# Patient Record
Sex: FEMALE | Race: BLACK OR AFRICAN AMERICAN | NOT HISPANIC OR LATINO | Employment: UNEMPLOYED | URBAN - METROPOLITAN AREA
[De-identification: names, ages, dates, MRNs, and addresses within clinical notes are randomized per-mention and may not be internally consistent; named-entity substitution may affect disease eponyms.]

---

## 2020-12-05 ENCOUNTER — HOSPITAL ENCOUNTER (EMERGENCY)
Facility: MEDICAL CENTER | Age: 25
End: 2020-12-05
Attending: EMERGENCY MEDICINE
Payer: MEDICAID

## 2020-12-05 VITALS
OXYGEN SATURATION: 98 % | HEART RATE: 76 BPM | TEMPERATURE: 98.5 F | BODY MASS INDEX: 31.97 KG/M2 | DIASTOLIC BLOOD PRESSURE: 106 MMHG | RESPIRATION RATE: 20 BRPM | SYSTOLIC BLOOD PRESSURE: 146 MMHG | WEIGHT: 173.72 LBS | HEIGHT: 62 IN

## 2020-12-05 DIAGNOSIS — L73.9 FOLLICULITIS: ICD-10-CM

## 2020-12-05 DIAGNOSIS — Z20.2 STD EXPOSURE: ICD-10-CM

## 2020-12-05 DIAGNOSIS — B96.89 GARDNERELLA VAGINITIS: ICD-10-CM

## 2020-12-05 DIAGNOSIS — N76.0 GARDNERELLA VAGINITIS: ICD-10-CM

## 2020-12-05 DIAGNOSIS — N89.8 VAGINAL DISCHARGE: ICD-10-CM

## 2020-12-05 DIAGNOSIS — H92.02 LEFT EAR PAIN: ICD-10-CM

## 2020-12-05 LAB
APPEARANCE UR: CLEAR
BILIRUB UR QL STRIP.AUTO: NEGATIVE
CANDIDA DNA VAG QL PROBE+SIG AMP: NEGATIVE
COLOR UR: YELLOW
G VAGINALIS DNA VAG QL PROBE+SIG AMP: POSITIVE
GLUCOSE UR STRIP.AUTO-MCNC: NEGATIVE MG/DL
HCG UR QL: NEGATIVE
KETONES UR STRIP.AUTO-MCNC: NEGATIVE MG/DL
LEUKOCYTE ESTERASE UR QL STRIP.AUTO: NEGATIVE
MICRO URNS: NORMAL
NITRITE UR QL STRIP.AUTO: NEGATIVE
PH UR STRIP.AUTO: 6 [PH] (ref 5–8)
PROT UR QL STRIP: NEGATIVE MG/DL
RBC UR QL AUTO: NEGATIVE
SP GR UR STRIP.AUTO: 1.03
T VAGINALIS DNA VAG QL PROBE+SIG AMP: NEGATIVE
TREPONEMA PALLIDUM IGG+IGM AB [PRESENCE] IN SERUM OR PLASMA BY IMMUNOASSAY: NORMAL
UROBILINOGEN UR STRIP.AUTO-MCNC: 0.2 MG/DL

## 2020-12-05 PROCEDURE — 96372 THER/PROPH/DIAG INJ SC/IM: CPT | Mod: EDC

## 2020-12-05 PROCEDURE — 87660 TRICHOMONAS VAGIN DIR PROBE: CPT | Mod: EDC

## 2020-12-05 PROCEDURE — 99284 EMERGENCY DEPT VISIT MOD MDM: CPT | Mod: EDC

## 2020-12-05 PROCEDURE — 87591 N.GONORRHOEAE DNA AMP PROB: CPT | Mod: EDC

## 2020-12-05 PROCEDURE — 81003 URINALYSIS AUTO W/O SCOPE: CPT | Mod: EDC

## 2020-12-05 PROCEDURE — 81025 URINE PREGNANCY TEST: CPT | Mod: EDC

## 2020-12-05 PROCEDURE — A9270 NON-COVERED ITEM OR SERVICE: HCPCS | Mod: EDC | Performed by: EMERGENCY MEDICINE

## 2020-12-05 PROCEDURE — 87491 CHLMYD TRACH DNA AMP PROBE: CPT | Mod: EDC

## 2020-12-05 PROCEDURE — 86780 TREPONEMA PALLIDUM: CPT | Mod: EDC

## 2020-12-05 PROCEDURE — 700101 HCHG RX REV CODE 250: Mod: EDC | Performed by: EMERGENCY MEDICINE

## 2020-12-05 PROCEDURE — 700102 HCHG RX REV CODE 250 W/ 637 OVERRIDE(OP): Mod: EDC | Performed by: EMERGENCY MEDICINE

## 2020-12-05 PROCEDURE — 700111 HCHG RX REV CODE 636 W/ 250 OVERRIDE (IP): Mod: EDC | Performed by: EMERGENCY MEDICINE

## 2020-12-05 PROCEDURE — 87480 CANDIDA DNA DIR PROBE: CPT | Mod: EDC

## 2020-12-05 PROCEDURE — 87510 GARDNER VAG DNA DIR PROBE: CPT | Mod: EDC

## 2020-12-05 RX ORDER — METRONIDAZOLE 500 MG/1
500 TABLET ORAL 2 TIMES DAILY
Qty: 14 TAB | Refills: 0 | Status: SHIPPED | OUTPATIENT
Start: 2020-12-05 | End: 2020-12-12

## 2020-12-05 RX ORDER — AZITHROMYCIN 250 MG/1
1000 TABLET, FILM COATED ORAL ONCE
Status: COMPLETED | OUTPATIENT
Start: 2020-12-05 | End: 2020-12-05

## 2020-12-05 RX ORDER — CEFTRIAXONE SODIUM 250 MG/1
250 INJECTION, POWDER, FOR SOLUTION INTRAMUSCULAR; INTRAVENOUS ONCE
Status: COMPLETED | OUTPATIENT
Start: 2020-12-05 | End: 2020-12-05

## 2020-12-05 RX ORDER — CEPHALEXIN 500 MG/1
500 CAPSULE ORAL 3 TIMES DAILY
Qty: 21 CAP | Refills: 0 | Status: SHIPPED | OUTPATIENT
Start: 2020-12-05 | End: 2020-12-12

## 2020-12-05 RX ADMIN — AZITHROMYCIN MONOHYDRATE 1000 MG: 250 TABLET ORAL at 18:44

## 2020-12-05 RX ADMIN — LIDOCAINE HYDROCHLORIDE 0.9 ML: 10 INJECTION, SOLUTION INFILTRATION; PERINEURAL at 18:45

## 2020-12-05 RX ADMIN — CEFTRIAXONE SODIUM 250 MG: 250 INJECTION, POWDER, FOR SOLUTION INTRAMUSCULAR; INTRAVENOUS at 18:45

## 2020-12-05 ASSESSMENT — LIFESTYLE VARIABLES: DO YOU DRINK ALCOHOL: NO

## 2020-12-06 NOTE — ED PROVIDER NOTES
ED Provider Note    CHIEF COMPLAINT  Chief Complaint   Patient presents with   • Vaginal Discharge     Green/ gray, malodorus disharge x 1 week.   • Lump     to left labia x 1 week   • Ear Pain     left, x 1 week, denies fevers       HPI  Valentin Persaud is a 25 y.o. female who presents to the emergency department complaining of left ear pain.  The patient's had left ear pain for about a week.  Pain is just inside the ear.  There is no drainage.  No sore throat runny nose or cough.  No right ear pain.  No headaches.    Patient complains of vaginal discharge.  She has any dysuria, hematuria dysuria frequency.  She just completed her menstrual period and she had some persistent malodorous discharge.  She she does have a new partner.  She has a remote history of STD but no recent STD.  She denies any other acute concerns or complaints.  No fevers chills nausea vomiting or other complaints.    REVIEW OF SYSTEMS  See HPI for further details. All other systems are negative.    PAST MEDICAL HISTORY  History reviewed. No pertinent past medical history.    FAMILY HISTORY  History reviewed. No pertinent family history.    SOCIAL HISTORY  Social History     Socioeconomic History   • Marital status: Single     Spouse name: Not on file   • Number of children: Not on file   • Years of education: Not on file   • Highest education level: Not on file   Occupational History   • Not on file   Social Needs   • Financial resource strain: Not on file   • Food insecurity     Worry: Not on file     Inability: Not on file   • Transportation needs     Medical: Not on file     Non-medical: Not on file   Tobacco Use   • Smoking status: Never Smoker   • Smokeless tobacco: Never Used   Substance and Sexual Activity   • Alcohol use: Not Currently   • Drug use: Not Currently   • Sexual activity: Not on file   Lifestyle   • Physical activity     Days per week: Not on file     Minutes per session: Not on file   • Stress: Not on file   Relationships  "  • Social connections     Talks on phone: Not on file     Gets together: Not on file     Attends Zoroastrian service: Not on file     Active member of club or organization: Not on file     Attends meetings of clubs or organizations: Not on file     Relationship status: Not on file   • Intimate partner violence     Fear of current or ex partner: Not on file     Emotionally abused: Not on file     Physically abused: Not on file     Forced sexual activity: Not on file   Other Topics Concern   • Not on file   Social History Narrative   • Not on file       SURGICAL HISTORY  History reviewed. No pertinent surgical history.    CURRENT MEDICATIONS  Home Medications     Reviewed by Rosalina Martínez R.N. (Registered Nurse) on 12/05/20 at 1717  Med List Status: Partial   Medication Last Dose Status        Patient David Taking any Medications                       ALLERGIES  No Known Allergies    PHYSICAL EXAM  VITAL SIGNS: /93   Pulse 71   Temp 37.3 °C (99.2 °F) (Temporal)   Resp 14   Ht 1.575 m (5' 2\")   Wt 78.8 kg (173 lb 11.6 oz)   SpO2 100%   BMI 31.77 kg/m²    Constitutional: Well developed, Well nourished, No acute distress, Non-toxic appearance.   HENT: Normocephalic, Atraumatic, Bilateral external ears normal, Oropharynx moist, No oral exudates, Nose normal.  Left TM is normal.  Left external canal is normal.  No mastoid tenderness.  Eyes: PERRL, EOMI, Conjunctiva normal, No discharge.   Neck: Normal range of motion  Cardiovascular: Normal heart rate, Normal rhythm, No murmurs,  Thorax & Lungs: Normal breath sounds, No respiratory distress  Abdomen: Bowel sounds normal, Soft, No tenderness  Skin: Warm, Dry, No erythema, No rash.   Genitalia: This exam is performed with Rosalina ROMERO as a chaperone.  There is a flesh-colored bump that is tender on the left side of her labia.  This is not a Bartholin's cyst.  It is somewhat shiny in appearance and may represent chancre.  There is no abscess to drain.  There is " no other lesions on her external mucosa or her internal mucosa.  She did not amount of discharge.  No cervicitis or cervical motion tenderness.  No adnexal masses  Musculoskeletal: Good range of motion in all major joints  Neurologic: Alert,  No focal deficits noted.   Psychiatric: Affect normal    Results for orders placed or performed during the hospital encounter of 12/05/20   URINALYSIS CULTURE, IF INDICATED    Specimen: Urine   Result Value Ref Range    Color Yellow     Character Clear     Specific Gravity 1.029 <1.035    Ph 6.0 5.0 - 8.0    Glucose Negative Negative mg/dL    Ketones Negative Negative mg/dL    Protein Negative Negative mg/dL    Bilirubin Negative Negative    Urobilinogen, Urine 0.2 Negative    Nitrite Negative Negative    Leukocyte Esterase Negative Negative    Occult Blood Negative Negative    Micro Urine Req see below    HCG QUALITATIVE UR (Lab)   Result Value Ref Range    Beta-Hcg Urine Negative Negative   CHLAMYDIA & GC BY PCR    Specimen: Genital   Result Value Ref Range    Source Vaginal    T.PALLIDUM AB EIA   Result Value Ref Range    Syphilis, Treponemal Qual Non-Reactive Non-Reactive   VAGINAL PATHOGENS DNA PANEL   Result Value Ref Range    Candida species DNA Probe Negative Negative    Trichamonas vaginalis DNA Probe Negative Negative    Gardnerella vaginalis DNA Probe POSITIVE (A) Negative        RADIOLOGY/PROCEDURES  No orders to display       COURSE & MEDICAL DECISION MAKING  Pertinent Labs & Imaging studies reviewed. (See chart for details)    The patient presents emerged part with multiple complaints.  Her first complaint is left ear pain.  She has no tenderness around the ear.  No signs of otitis externa or otitis media.  We will treat her otalgia over-the-counter pain medicines reassurance and follow-up with primary care.    Patient's next complaint is vaginal discharge.  She has a new partner and unprotected sex and is concerned about possible exposure to an STD.  Urinalysis  and pregnancy test are unremarkable.  The patient has a GC and chlamydia which are collected and empirically treated.    Her discharge a DNA probe was performed this positive Gardnerella we will treat her with Flagyl.      Regarding her STD exposure she is counseled practice safe sex.  She is referred to GYN.  Lastly she has a tender lump on the outside of her vagina.  This is on the skin surface noted to mucosa it is somewhat shiny in appearance.  Differential diagnosis includes folliculitis, HSV, or possible syphilis infection.  It is not vesicular nor does she have other vesicular lesions I think herpes is unlikely.  Syphilis is certainly likely but her syphilis test is negative.  We will treat empirically folliculitis because it is tender redness in the area multiple hair follicles.  This could be a pyogenic granuloma.  The patient again referred to primary care as well as GYN.    She is given instructions to return to the emergency department for increasing pain swelling redness or other concerns.  Empirically treated with Keflex for skin and soft tissue infection.  She will return    At this point her abdominal exam is benign.  She is afebrile and nontoxic she will be discharged home.  She will return for increasing pain or other concerns as discussed.  Questions are answered she is agreeable to plan she is discharged home.    Poncho Palomino M.D.  [address] [place] [id number] [phone]    Schedule an appointment as soon as possible for a visit in 2 days      [address] [address]  [phone]  Schedule an appointment as soon as possible for a visit in 2 days          FINAL IMPRESSION  1. Left ear pain     2. Vaginal discharge     3. Folliculitis     4. STD exposure     5. Gardnerella vaginitis         2.   3.         Electronically signed by: Ramón Chaidez M.D., 12/5/2020 5:49 PM

## 2020-12-06 NOTE — ED NOTES
"[facility]    Discharge instructions, which include signs and symptoms to monitor patient for, as well as detailed information regarding vaginal discharge provided.  All questions and concerns addressed at this time.  This RN also encouraged a follow- up appointment to be made with patient's PCP.     Prescription for keflex/flagyl provided to patient.    Patient leaves ER in no apparent distress. This RN provided education regarding returning to the ER for any new concerns or changes in patient's condition.      /106   Pulse 76   Temp 36.9 °C (98.5 °F) (Oral)   Resp 20   Ht 1.575 m (5' 2\")   Wt 78.8 kg (173 lb 11.6 oz)   SpO2 98%   BMI 31.77 kg/m²   "

## 2020-12-06 NOTE — ED TRIAGE NOTES
Pt ambulatory to room for   Chief Complaint   Patient presents with   • Vaginal Discharge     Green/ gray, malodorus disharge x 1 week.   • Lump     to left labia x 1 week   • Ear Pain     left, x 1 week, denies fevers     Pt reports new sexual partner and didn't wear a condom.  Pt is alert and in NAD

## 2020-12-06 NOTE — DISCHARGE INSTRUCTIONS
Take antibiotics as prescribed.  Take Flagyl for the vaginal discharge.  Take Keflex for the bump on the left side of your labia.  This may need further work-up and treatment as an outpatient if it persist.  Follow-up with primary care and GYN.  Return for pain, or other concerns.  Practice safe sex.

## 2020-12-07 LAB
C TRACH DNA SPEC QL NAA+PROBE: NEGATIVE
N GONORRHOEA DNA SPEC QL NAA+PROBE: NEGATIVE
SPECIMEN SOURCE: NORMAL

## 2021-01-08 ENCOUNTER — HOSPITAL ENCOUNTER (EMERGENCY)
Facility: MEDICAL CENTER | Age: 26
End: 2021-01-08
Attending: EMERGENCY MEDICINE
Payer: MEDICAID

## 2021-01-08 VITALS
RESPIRATION RATE: 16 BRPM | SYSTOLIC BLOOD PRESSURE: 128 MMHG | TEMPERATURE: 97.5 F | DIASTOLIC BLOOD PRESSURE: 88 MMHG | BODY MASS INDEX: 33.1 KG/M2 | WEIGHT: 179.9 LBS | OXYGEN SATURATION: 99 % | HEART RATE: 80 BPM | HEIGHT: 62 IN

## 2021-01-08 DIAGNOSIS — B96.89 BACTERIAL VAGINITIS: ICD-10-CM

## 2021-01-08 DIAGNOSIS — B37.31 VAGINAL YEAST INFECTION: ICD-10-CM

## 2021-01-08 DIAGNOSIS — N76.0 BACTERIAL VAGINITIS: ICD-10-CM

## 2021-01-08 DIAGNOSIS — Z20.2 POSSIBLE EXPOSURE TO STD: ICD-10-CM

## 2021-01-08 LAB
APPEARANCE UR: ABNORMAL
BACTERIA #/AREA URNS HPF: ABNORMAL /HPF
BILIRUB UR QL STRIP.AUTO: NEGATIVE
C TRACH DNA SPEC QL NAA+PROBE: NEGATIVE
CANDIDA DNA VAG QL PROBE+SIG AMP: POSITIVE
COLOR UR: YELLOW
EPI CELLS #/AREA URNS HPF: ABNORMAL /HPF
G VAGINALIS DNA VAG QL PROBE+SIG AMP: POSITIVE
GLUCOSE UR STRIP.AUTO-MCNC: NEGATIVE MG/DL
HCG UR QL: NEGATIVE
HYALINE CASTS #/AREA URNS LPF: ABNORMAL /LPF
KETONES UR STRIP.AUTO-MCNC: ABNORMAL MG/DL
LEUKOCYTE ESTERASE UR QL STRIP.AUTO: ABNORMAL
MICRO URNS: ABNORMAL
N GONORRHOEA DNA SPEC QL NAA+PROBE: NEGATIVE
NITRITE UR QL STRIP.AUTO: NEGATIVE
PH UR STRIP.AUTO: 7 [PH] (ref 5–8)
PROT UR QL STRIP: NEGATIVE MG/DL
RBC # URNS HPF: ABNORMAL /HPF
RBC UR QL AUTO: NEGATIVE
SP GR UR STRIP.AUTO: 1.02
SPECIMEN SOURCE: NORMAL
T VAGINALIS DNA VAG QL PROBE+SIG AMP: NEGATIVE
UROBILINOGEN UR STRIP.AUTO-MCNC: 1 MG/DL
WBC #/AREA URNS HPF: ABNORMAL /HPF

## 2021-01-08 PROCEDURE — 87591 N.GONORRHOEAE DNA AMP PROB: CPT

## 2021-01-08 PROCEDURE — A9270 NON-COVERED ITEM OR SERVICE: HCPCS | Performed by: EMERGENCY MEDICINE

## 2021-01-08 PROCEDURE — 81025 URINE PREGNANCY TEST: CPT

## 2021-01-08 PROCEDURE — 99284 EMERGENCY DEPT VISIT MOD MDM: CPT

## 2021-01-08 PROCEDURE — 87086 URINE CULTURE/COLONY COUNT: CPT

## 2021-01-08 PROCEDURE — 96372 THER/PROPH/DIAG INJ SC/IM: CPT

## 2021-01-08 PROCEDURE — 87660 TRICHOMONAS VAGIN DIR PROBE: CPT

## 2021-01-08 PROCEDURE — 87510 GARDNER VAG DNA DIR PROBE: CPT

## 2021-01-08 PROCEDURE — 81001 URINALYSIS AUTO W/SCOPE: CPT

## 2021-01-08 PROCEDURE — 700102 HCHG RX REV CODE 250 W/ 637 OVERRIDE(OP): Performed by: EMERGENCY MEDICINE

## 2021-01-08 PROCEDURE — 87491 CHLMYD TRACH DNA AMP PROBE: CPT

## 2021-01-08 PROCEDURE — 700111 HCHG RX REV CODE 636 W/ 250 OVERRIDE (IP): Performed by: EMERGENCY MEDICINE

## 2021-01-08 PROCEDURE — 700101 HCHG RX REV CODE 250: Performed by: EMERGENCY MEDICINE

## 2021-01-08 PROCEDURE — 87480 CANDIDA DNA DIR PROBE: CPT

## 2021-01-08 RX ORDER — FLUCONAZOLE 100 MG/1
150 TABLET ORAL ONCE
Status: COMPLETED | OUTPATIENT
Start: 2021-01-08 | End: 2021-01-08

## 2021-01-08 RX ORDER — FLUCONAZOLE 150 MG/1
150 TABLET ORAL ONCE
Qty: 1 TAB | Refills: 0 | Status: SHIPPED | OUTPATIENT
Start: 2021-01-08 | End: 2021-01-08

## 2021-01-08 RX ORDER — CEFTRIAXONE 500 MG/1
500 INJECTION, POWDER, FOR SOLUTION INTRAMUSCULAR; INTRAVENOUS ONCE
Status: COMPLETED | OUTPATIENT
Start: 2021-01-08 | End: 2021-01-08

## 2021-01-08 RX ORDER — LIDOCAINE HYDROCHLORIDE AND EPINEPHRINE 10; 10 MG/ML; UG/ML
10 INJECTION, SOLUTION INFILTRATION; PERINEURAL ONCE
Status: DISCONTINUED | OUTPATIENT
Start: 2021-01-08 | End: 2021-01-08 | Stop reason: CLARIF

## 2021-01-08 RX ORDER — METRONIDAZOLE 500 MG/1
500 TABLET ORAL ONCE
Status: COMPLETED | OUTPATIENT
Start: 2021-01-08 | End: 2021-01-08

## 2021-01-08 RX ORDER — METRONIDAZOLE 500 MG/1
500 TABLET ORAL 2 TIMES DAILY
Qty: 14 TAB | Refills: 0 | Status: SHIPPED | OUTPATIENT
Start: 2021-01-08 | End: 2021-01-15

## 2021-01-08 RX ORDER — DOXYCYCLINE 100 MG/1
100 CAPSULE ORAL 2 TIMES DAILY
Qty: 14 CAP | Refills: 0 | Status: SHIPPED | OUTPATIENT
Start: 2021-01-08 | End: 2021-01-15

## 2021-01-08 RX ORDER — DOXYCYCLINE 100 MG/1
100 TABLET ORAL ONCE
Status: COMPLETED | OUTPATIENT
Start: 2021-01-08 | End: 2021-01-08

## 2021-01-08 RX ADMIN — LIDOCAINE HYDROCHLORIDE 1 ML: 10 INJECTION, SOLUTION INFILTRATION; PERINEURAL at 14:37

## 2021-01-08 RX ADMIN — METRONIDAZOLE 500 MG: 500 TABLET ORAL at 15:54

## 2021-01-08 RX ADMIN — FLUCONAZOLE 150 MG: 100 TABLET ORAL at 15:53

## 2021-01-08 RX ADMIN — CEFTRIAXONE SODIUM 500 MG: 500 INJECTION, POWDER, FOR SOLUTION INTRAMUSCULAR; INTRAVENOUS at 14:38

## 2021-01-08 RX ADMIN — DOXYCYCLINE 100 MG: 100 TABLET, FILM COATED ORAL at 14:37

## 2021-01-08 NOTE — ED TRIAGE NOTES
"Valentin Persaud  25 y.o.  Chief Complaint   Patient presents with   • Painful Urination   • Vaginal Itching   • Vaginal Discharge     \"clumpy greenish\"     Ambulatory to triage with steady gait for above. A & O x 4, GCS 15. Mask in place.    Endorses symptoms x 1.5 weeks. Denies fevers.    Hx. BV, vaginal yeast infections, chlamydia    Specimen container provided to patient and instructed on clean catch urine sample - verbalized understanding.    Triage process explained to patient, apologized for wait time, and returned to lobby.  "

## 2021-01-09 NOTE — ED PROVIDER NOTES
"CHIEF COMPLAINT  Chief Complaint   Patient presents with   • Painful Urination   • Vaginal Itching   • Vaginal Discharge     \"clumpy greenish\"       [facility]  Valentin Persaud is a 25 y.o. female who presents to the emergency concern for possible STD.  She describes treatment for bacterial vaginosis last month.  Unprotected sex with partner whom she is found out has had other partners recently and now concern for additional infection.  Patient describes malodorous thick green vaginal discharge.  Vaginal burning and itching as well.  No urinary frequency, hesitation or hematuria.  LMP last week, denies pregnancy.  Denies fever chills.  Denies abdominal pain.    REVIEW OF SYSTEMS  See HPI for further details. All other systems are negative.     PAST MEDICAL HISTORY   has a past medical history of BV (bacterial vaginosis), Chlamydia, and Vaginal yeast infection.    SOCIAL HISTORY  Social History     Tobacco Use   • Smoking status: Never Smoker   • Smokeless tobacco: Never Used   Substance and Sexual Activity   • Alcohol use: Not Currently   • Drug use: Not Currently   • Sexual activity: Not on file       SURGICAL HISTORY  patient denies any surgical history    CURRENT MEDICATIONS  Home Medications     Reviewed by Effie Solano R.N. (Registered Nurse) on 01/08/21 at 1300  Med List Status: Partial   Medication Last Dose Status        Patient David Taking any Medications                       ALLERGIES  No Known Allergies    PHYSICAL EXAM  VITAL SIGNS: /88   Pulse 80   Temp 36.4 °C (97.5 °F) (Temporal)   Resp 16   Ht 1.575 m (5' 2\")   Wt 81.6 kg (179 lb 14.3 oz)   LMP 12/28/2020   SpO2 99%   BMI 32.90 kg/m²   Pulse ox interpretation: I interpret this pulse ox as normal.  Constitutional: Alert in no apparent distress.  HENT: Normocephalic, atraumatic. Bilateral external ears normal, Nose normal. Moist mucous membranes.    Eyes: Conjunctiva normal.   Neck: Normal range of motion  Lymphatic: No " lymphadenopathy noted.  No inguinal mass or lymphadenopathy.  Cardiovascular: Normal peripheral perfusion.  Thorax & Lungs: Nonlabored respirations.  Abdomen: Soft, non-distended, non-tender to palpation.   : Normal external genitalia.  Vaginal exam with thick white nonodorous discharge.  Nonfriable cervix.  No cervical motion or adnexal tenderness.  Skin: Warm, Dry, No erythema, No rash.   Musculoskeletal: Good range of motion in all major joints.   Neurologic: Alert and oriented x4.  Ambulates independently.  Psychiatric: Affect normal, Judgment normal, Mood normal.       DIAGNOSTIC STUDIES / PROCEDURES  LABS  Results for orders placed or performed during the hospital encounter of 01/08/21   URINALYSIS CULTURE, IF INDICATED    Specimen: Urine   Result Value Ref Range    Color Yellow     Character Cloudy (A)     Specific Gravity 1.022 <1.035    Ph 7.0 5.0 - 8.0    Glucose Negative Negative mg/dL    Ketones Trace (A) Negative mg/dL    Protein Negative Negative mg/dL    Bilirubin Negative Negative    Urobilinogen, Urine 1.0 Negative    Nitrite Negative Negative    Leukocyte Esterase Large (A) Negative    Occult Blood Negative Negative    Micro Urine Req Microscopic    HCG QUALITATIVE UR   Result Value Ref Range    Beta-Hcg Urine Negative Negative   URINE MICROSCOPIC (W/UA)   Result Value Ref Range    WBC 10-20 (A) /hpf    RBC 2-5 (A) /hpf    Bacteria Few (A) None /hpf    Epithelial Cells Many (A) /hpf    Hyaline Cast 0-2 /lpf   Chlamydia/GC PCR Urine Or Swab    Specimen: Genital   Result Value Ref Range    Source Vaginal    Vaginal Pathogens DNA Panel   Result Value Ref Range    Candida species DNA Probe POSITIVE (A) Negative    Trichamonas vaginalis DNA Probe Negative Negative    Gardnerella vaginalis DNA Probe POSITIVE (A) Negative       COURSE & MEDICAL DECISION MAKING  Nursing notes and vital signs were reviewed. (See chart for details)  The patients  records were reviewed, history was obtained from the  patient;     ED evaluation does demonstrate bacterial vaginosis, vaginal candidiasis.  No evidence for trichomonas.  Gonorrhea and chlamydia swabs are pending, although she being treated empirically for this with 500 mg of Rocephin and oral doxycycline for 7 days.  First dose of medications in the emergency department to also include Flagyl and Diflucan.  Urinalysis with large leukocyte esterase, no nitrite, no other urinary symptoms, will treatment for this suspect contaminant from the vaginal discharge.  She is cautioned for ongoing symptoms at which time she will return for further assessment.  Otherwise abdominal exam is benign.  No clinical evidence of pyelonephritis or sepsis.  Hemodynamically stable.    Patient is stable for discharge at this time, anticipatory guidance provided, doxycycline, Flagyl for 7 days, fluconazole again in 72 hours if still symptomatic, close follow-up is encouraged, and strict ED return instructions have been detailed. Patient is agreeable to the disposition and plan.    Patient's blood pressure was elevated in the emergency department, and has been referred to primary care for close monitoring.      FINAL IMPRESSION  (N76.0,  B96.89) Bacterial vaginitis  (B37.3) Vaginal yeast infection  (Z20.2) Possible exposure to STD      Electronically signed by: Sarah Richards D.O., 1/8/2021 5:03 PM      This dictation was created using voice recognition software. The accuracy of the dictation is limited to the abilities of the software. I expect there may be some errors of grammar and possibly content. The nursing notes were reviewed and certain aspects of this information were incorporated into this note.

## 2021-01-09 NOTE — ED NOTES
VSS.  Discharge instructions and prescriptions x 3 given- verbalizes understanding.   Ambulatory to lobby with steady gait.

## 2021-01-09 NOTE — DISCHARGE INSTRUCTIONS
Follow-up with primary care next week for reevaluation, to establish care, for medication results, medication management close blood pressure monitoring.    Doxycycline twice daily for 7 days for possible chlamydia, or until test results are available.  Flagyl twice daily for 7 days for bacterial vaginosis.  Take fluconazole tablet once in 72 hours if symptoms are persistent after your dose today.    Return to the emergency department for abdominal pain, fever, vomiting, other new concerns.

## 2021-01-11 LAB
BACTERIA UR CULT: NORMAL
SIGNIFICANT IND 70042: NORMAL
SITE SITE: NORMAL
SOURCE SOURCE: NORMAL

## 2021-03-13 ENCOUNTER — APPOINTMENT (OUTPATIENT)
Dept: RADIOLOGY | Facility: MEDICAL CENTER | Age: 26
End: 2021-03-13
Attending: EMERGENCY MEDICINE
Payer: MEDICAID

## 2021-03-13 ENCOUNTER — HOSPITAL ENCOUNTER (EMERGENCY)
Facility: MEDICAL CENTER | Age: 26
End: 2021-03-13
Attending: EMERGENCY MEDICINE
Payer: MEDICAID

## 2021-03-13 VITALS
BODY MASS INDEX: 31.64 KG/M2 | RESPIRATION RATE: 16 BRPM | TEMPERATURE: 97.8 F | HEIGHT: 63 IN | WEIGHT: 178.57 LBS | HEART RATE: 71 BPM | DIASTOLIC BLOOD PRESSURE: 70 MMHG | SYSTOLIC BLOOD PRESSURE: 116 MMHG | OXYGEN SATURATION: 98 %

## 2021-03-13 DIAGNOSIS — O46.90 VAGINAL BLEEDING IN PREGNANCY: ICD-10-CM

## 2021-03-13 DIAGNOSIS — O21.9 NAUSEA/VOMITING IN PREGNANCY: ICD-10-CM

## 2021-03-13 DIAGNOSIS — N76.0 BACTERIAL VAGINOSIS: ICD-10-CM

## 2021-03-13 DIAGNOSIS — Z20.2 POSSIBLE EXPOSURE TO STD: ICD-10-CM

## 2021-03-13 DIAGNOSIS — O26.891 ABDOMINAL PAIN DURING PREGNANCY IN FIRST TRIMESTER: ICD-10-CM

## 2021-03-13 DIAGNOSIS — R10.9 ABDOMINAL PAIN DURING PREGNANCY IN FIRST TRIMESTER: ICD-10-CM

## 2021-03-13 DIAGNOSIS — B96.89 BACTERIAL VAGINOSIS: ICD-10-CM

## 2021-03-13 LAB
ALBUMIN SERPL BCP-MCNC: 4.7 G/DL (ref 3.2–4.9)
ALBUMIN/GLOB SERPL: 1.2 G/DL
ALP SERPL-CCNC: 58 U/L (ref 30–99)
ALT SERPL-CCNC: 12 U/L (ref 2–50)
ANION GAP SERPL CALC-SCNC: 11 MMOL/L (ref 7–16)
APPEARANCE UR: CLEAR
AST SERPL-CCNC: 19 U/L (ref 12–45)
B-HCG SERPL-ACNC: ABNORMAL MIU/ML (ref 0–5)
BACTERIA #/AREA URNS HPF: ABNORMAL /HPF
BASOPHILS # BLD AUTO: 0.6 % (ref 0–1.8)
BASOPHILS # BLD: 0.04 K/UL (ref 0–0.12)
BILIRUB SERPL-MCNC: 0.5 MG/DL (ref 0.1–1.5)
BILIRUB UR QL STRIP.AUTO: NEGATIVE
BUN SERPL-MCNC: 6 MG/DL (ref 8–22)
C TRACH DNA SPEC QL NAA+PROBE: NEGATIVE
CALCIUM SERPL-MCNC: 9.9 MG/DL (ref 8.5–10.5)
CANDIDA DNA VAG QL PROBE+SIG AMP: NEGATIVE
CHLORIDE SERPL-SCNC: 99 MMOL/L (ref 96–112)
CO2 SERPL-SCNC: 22 MMOL/L (ref 20–33)
COLOR UR: YELLOW
CREAT SERPL-MCNC: 0.58 MG/DL (ref 0.5–1.4)
EOSINOPHIL # BLD AUTO: 0.1 K/UL (ref 0–0.51)
EOSINOPHIL NFR BLD: 1.4 % (ref 0–6.9)
EPI CELLS #/AREA URNS HPF: ABNORMAL /HPF
ERYTHROCYTE [DISTWIDTH] IN BLOOD BY AUTOMATED COUNT: 46.8 FL (ref 35.9–50)
G VAGINALIS DNA VAG QL PROBE+SIG AMP: POSITIVE
GLOBULIN SER CALC-MCNC: 3.9 G/DL (ref 1.9–3.5)
GLUCOSE SERPL-MCNC: 94 MG/DL (ref 65–99)
GLUCOSE UR STRIP.AUTO-MCNC: NEGATIVE MG/DL
HCG SERPL QL: POSITIVE
HCT VFR BLD AUTO: 41.8 % (ref 37–47)
HGB BLD-MCNC: 13.8 G/DL (ref 12–16)
IMM GRANULOCYTES # BLD AUTO: 0.01 K/UL (ref 0–0.11)
IMM GRANULOCYTES NFR BLD AUTO: 0.1 % (ref 0–0.9)
KETONES UR STRIP.AUTO-MCNC: >=80 MG/DL
LEUKOCYTE ESTERASE UR QL STRIP.AUTO: NEGATIVE
LIPASE SERPL-CCNC: 22 U/L (ref 11–82)
LYMPHOCYTES # BLD AUTO: 1.61 K/UL (ref 1–4.8)
LYMPHOCYTES NFR BLD: 22.6 % (ref 22–41)
MCH RBC QN AUTO: 30.4 PG (ref 27–33)
MCHC RBC AUTO-ENTMCNC: 33 G/DL (ref 33.6–35)
MCV RBC AUTO: 92.1 FL (ref 81.4–97.8)
MICRO URNS: ABNORMAL
MONOCYTES # BLD AUTO: 0.46 K/UL (ref 0–0.85)
MONOCYTES NFR BLD AUTO: 6.5 % (ref 0–13.4)
N GONORRHOEA DNA SPEC QL NAA+PROBE: NEGATIVE
NEUTROPHILS # BLD AUTO: 4.9 K/UL (ref 2–7.15)
NEUTROPHILS NFR BLD: 68.8 % (ref 44–72)
NITRITE UR QL STRIP.AUTO: NEGATIVE
NRBC # BLD AUTO: 0 K/UL
NRBC BLD-RTO: 0 /100 WBC
NUMBER OF RH DOSES IND 8505RD: NORMAL
PH UR STRIP.AUTO: 7 [PH] (ref 5–8)
PLATELET # BLD AUTO: 259 K/UL (ref 164–446)
PMV BLD AUTO: 10.2 FL (ref 9–12.9)
POTASSIUM SERPL-SCNC: 3.6 MMOL/L (ref 3.6–5.5)
PROT SERPL-MCNC: 8.6 G/DL (ref 6–8.2)
PROT UR QL STRIP: NEGATIVE MG/DL
RBC # BLD AUTO: 4.54 M/UL (ref 4.2–5.4)
RBC # URNS HPF: ABNORMAL /HPF
RBC UR QL AUTO: ABNORMAL
RH BLD: NORMAL
SODIUM SERPL-SCNC: 132 MMOL/L (ref 135–145)
SP GR UR STRIP.AUTO: 1.02
SPECIMEN SOURCE: NORMAL
T VAGINALIS DNA VAG QL PROBE+SIG AMP: NEGATIVE
TREPONEMA PALLIDUM IGG+IGM AB [PRESENCE] IN SERUM OR PLASMA BY IMMUNOASSAY: NORMAL
UROBILINOGEN UR STRIP.AUTO-MCNC: 0.2 MG/DL
WBC # BLD AUTO: 7.1 K/UL (ref 4.8–10.8)
WBC #/AREA URNS HPF: ABNORMAL /HPF

## 2021-03-13 PROCEDURE — 96374 THER/PROPH/DIAG INJ IV PUSH: CPT | Mod: EDC

## 2021-03-13 PROCEDURE — 87491 CHLMYD TRACH DNA AMP PROBE: CPT

## 2021-03-13 PROCEDURE — 99285 EMERGENCY DEPT VISIT HI MDM: CPT | Mod: EDC

## 2021-03-13 PROCEDURE — 87660 TRICHOMONAS VAGIN DIR PROBE: CPT

## 2021-03-13 PROCEDURE — 36415 COLL VENOUS BLD VENIPUNCTURE: CPT | Mod: EDC

## 2021-03-13 PROCEDURE — 84703 CHORIONIC GONADOTROPIN ASSAY: CPT

## 2021-03-13 PROCEDURE — 87510 GARDNER VAG DNA DIR PROBE: CPT

## 2021-03-13 PROCEDURE — 84702 CHORIONIC GONADOTROPIN TEST: CPT

## 2021-03-13 PROCEDURE — 700111 HCHG RX REV CODE 636 W/ 250 OVERRIDE (IP): Performed by: EMERGENCY MEDICINE

## 2021-03-13 PROCEDURE — 81001 URINALYSIS AUTO W/SCOPE: CPT

## 2021-03-13 PROCEDURE — 700105 HCHG RX REV CODE 258: Performed by: EMERGENCY MEDICINE

## 2021-03-13 PROCEDURE — 86901 BLOOD TYPING SEROLOGIC RH(D): CPT

## 2021-03-13 PROCEDURE — 76801 OB US < 14 WKS SINGLE FETUS: CPT

## 2021-03-13 PROCEDURE — 85025 COMPLETE CBC W/AUTO DIFF WBC: CPT

## 2021-03-13 PROCEDURE — 87591 N.GONORRHOEAE DNA AMP PROB: CPT

## 2021-03-13 PROCEDURE — 83690 ASSAY OF LIPASE: CPT

## 2021-03-13 PROCEDURE — 87480 CANDIDA DNA DIR PROBE: CPT

## 2021-03-13 PROCEDURE — 86780 TREPONEMA PALLIDUM: CPT

## 2021-03-13 PROCEDURE — 80053 COMPREHEN METABOLIC PANEL: CPT

## 2021-03-13 RX ORDER — ONDANSETRON 2 MG/ML
4 INJECTION INTRAMUSCULAR; INTRAVENOUS ONCE
Status: COMPLETED | OUTPATIENT
Start: 2021-03-13 | End: 2021-03-13

## 2021-03-13 RX ORDER — METRONIDAZOLE 500 MG/1
500 TABLET ORAL 2 TIMES DAILY
Qty: 14 TABLET | Refills: 0 | Status: SHIPPED | OUTPATIENT
Start: 2021-03-13 | End: 2021-03-20

## 2021-03-13 RX ORDER — SODIUM CHLORIDE 9 MG/ML
1000 INJECTION, SOLUTION INTRAVENOUS ONCE
Status: COMPLETED | OUTPATIENT
Start: 2021-03-13 | End: 2021-03-13

## 2021-03-13 RX ADMIN — ONDANSETRON 4 MG: 2 INJECTION INTRAMUSCULAR; INTRAVENOUS at 11:14

## 2021-03-13 RX ADMIN — SODIUM CHLORIDE 1000 ML: 9 INJECTION, SOLUTION INTRAVENOUS at 11:14

## 2021-03-13 NOTE — DISCHARGE INSTRUCTIONS
Follow-up with your OB/GYN in [place] as scheduled next week for reevaluation, follow-up testing results for possible STD, noted resolution of Gardnerella and continued prenatal care.    Flagyl twice daily for 7 days.  Start taking prenatal vitamin daily.    Return to the emergency department for abdominal pain, vaginal bleeding, vomiting, fever or other new concerns.

## 2021-03-13 NOTE — ED TRIAGE NOTES
Chief Complaint   Patient presents with   • Nausea   • Vomiting   • Vaginal Discharge     foul odor and brown   • Lip Swelling     Pt states n/v and vaginal discharge X 2 weeks.  Pt c/o lip swelling X 4 days.  Pt concerned for bug bite.  Triage process explained to patient.  Pt instructed to inform RN if any changes or questions arise.  Pt back to waiting room.

## 2021-03-13 NOTE — ED NOTES
IV established. Labs, UA collected and sent. Medicated for nausea, bolus infusing.  Patient's son provided ipad for entertainment as he his reported to be autistic.

## 2021-03-13 NOTE — ED NOTES
"Valentin Lainez Yaima Persaud has been discharged from the Emergency Room.    Discharge instructions, which include signs and symptoms to monitor at home for, as well as detailed information regarding abdominal pain, nausea, vaginal bleeding and STD exposure provided.  All questions and concerns addressed at this time.      Prescription for Flagyl provided to patient.   Patient educated about the importance of completing the full course of antibiotic, even if symptoms subside, verbalized understanding.    Patient leaves ER in no apparent distress. This RN provided education regarding returning to the ER for any new concerns or changes in patient's condition.      /70   Pulse 71   Temp 36.6 °C (97.8 °F) (Temporal)   Resp 16   Ht 1.6 m (5' 3\")   Wt 81 kg (178 lb 9.2 oz)   SpO2 98%   BMI 31.63 kg/m²   "

## 2021-03-13 NOTE — ED PROVIDER NOTES
"ED Provider Note    CHIEF COMPLAINT  Chief Complaint   Patient presents with   • Nausea   • Vomiting   • Vaginal Discharge     foul odor and brown   • Lip Swelling       [facility]  Duanecheikh Fatou Persaud is a 25 y.o. female who presents to the emergency department through triage for low abdominal discomfort, nausea, vomiting and vaginal discharge.  Patient states LMP approximately 4 weeks ago, last unprotected intercourse near that time.  Approximately 3 weeks of some lower abdominal discomfort and cramping.  1 week of vaginal bleeding, now brown spotting.  Also malodorous and \"gray\" in color.  History of chlamydia, BV, previously treated in full.  Concern for new exposure after last intercourse.  Nausea with vomiting, dry heaving almost daily for 1 to 2 weeks.    No fever or chills.  No diarrhea constipation.  Denies sick contacts.    REVIEW OF SYSTEMS  See HPI for further details. All other systems are negative.     PAST MEDICAL HISTORY   has a past medical history of BV (bacterial vaginosis), Chlamydia, and Vaginal yeast infection.    SOCIAL HISTORY  Social History     Tobacco Use   • Smoking status: Never Smoker   • Smokeless tobacco: Never Used   Substance and Sexual Activity   • Alcohol use: Not Currently   • Drug use: Not Currently   • Sexual activity: Not on file       SURGICAL HISTORY  patient denies any surgical history    CURRENT MEDICATIONS  Home Medications    **Home medications have not yet been reviewed for this encounter**         ALLERGIES  No Known Allergies    PHYSICAL EXAM  VITAL SIGNS: /81   Pulse 71   Temp 36.3 °C (97.3 °F) (Temporal)   Resp 18   Ht 1.6 m (5' 3\")   Wt 81 kg (178 lb 9.2 oz)   SpO2 99%   BMI 31.63 kg/m²   Pulse ox interpretation: I interpret this pulse ox as normal.  Constitutional: Alert in no apparent distress.  Actively vomiting, dry heaving.  HENT: Normocephalic, atraumatic. Bilateral external ears normal, Nose normal.  Dry mucous membranes.    Eyes: Pupils " are equal and reactive, Conjunctiva normal.   Neck: Normal range of motion, Supple  Lymphatic: No lymphadenopathy noted.   Cardiovascular: Regular rate and rhythm, no murmurs. Distal pulses intact.    Thorax & Lungs: Normal breath sounds.  No wheezing/rales/ronchi. No increased work of breathing  Abdomen: Obese, soft, nondistended.  No palpable fundus.  Generalized discomfort with palpation.  No rebound, guarding or peritonitis.  : Scant nonodorous mucus-like discharge.  Nonfriable cervix.  Bimanual exam with mild cervical motion discomfort, no adnexal tenderness or palpable mass.  Os closed.  Skin: Warm, Dry, No erythema, No rash.   Musculoskeletal: Good range of motion in all major joints.   Neurologic: Alert and orient x4.  Ambulates independently.  Psychiatric: Affect normal, Judgment normal, Mood normal.       DIAGNOSTIC STUDIES / PROCEDURES    LABS  Results for orders placed or performed during the hospital encounter of 03/13/21   CBC WITH DIFFERENTIAL   Result Value Ref Range    WBC 7.1 4.8 - 10.8 K/uL    RBC 4.54 4.20 - 5.40 M/uL    Hemoglobin 13.8 12.0 - 16.0 g/dL    Hematocrit 41.8 37.0 - 47.0 %    MCV 92.1 81.4 - 97.8 fL    MCH 30.4 27.0 - 33.0 pg    MCHC 33.0 (L) 33.6 - 35.0 g/dL    RDW 46.8 35.9 - 50.0 fL    Platelet Count 259 164 - 446 K/uL    MPV 10.2 9.0 - 12.9 fL    Neutrophils-Polys 68.80 44.00 - 72.00 %    Lymphocytes 22.60 22.00 - 41.00 %    Monocytes 6.50 0.00 - 13.40 %    Eosinophils 1.40 0.00 - 6.90 %    Basophils 0.60 0.00 - 1.80 %    Immature Granulocytes 0.10 0.00 - 0.90 %    Nucleated RBC 0.00 /100 WBC    Neutrophils (Absolute) 4.90 2.00 - 7.15 K/uL    Lymphs (Absolute) 1.61 1.00 - 4.80 K/uL    Monos (Absolute) 0.46 0.00 - 0.85 K/uL    Eos (Absolute) 0.10 0.00 - 0.51 K/uL    Baso (Absolute) 0.04 0.00 - 0.12 K/uL    Immature Granulocytes (abs) 0.01 0.00 - 0.11 K/uL    NRBC (Absolute) 0.00 K/uL   COMP METABOLIC PANEL   Result Value Ref Range    Sodium 132 (L) 135 - 145 mmol/L    Potassium  3.6 3.6 - 5.5 mmol/L    Chloride 99 96 - 112 mmol/L    Co2 22 20 - 33 mmol/L    Anion Gap 11.0 7.0 - 16.0    Glucose 94 65 - 99 mg/dL    Bun 6 (L) 8 - 22 mg/dL    Creatinine 0.58 0.50 - 1.40 mg/dL    Calcium 9.9 8.5 - 10.5 mg/dL    AST(SGOT) 19 12 - 45 U/L    ALT(SGPT) 12 2 - 50 U/L    Alkaline Phosphatase 58 30 - 99 U/L    Total Bilirubin 0.5 0.1 - 1.5 mg/dL    Albumin 4.7 3.2 - 4.9 g/dL    Total Protein 8.6 (H) 6.0 - 8.2 g/dL    Globulin 3.9 (H) 1.9 - 3.5 g/dL    A-G Ratio 1.2 g/dL   LIPASE   Result Value Ref Range    Lipase 22 11 - 82 U/L   URINALYSIS,CULTURE IF INDICATED    Specimen: Blood   Result Value Ref Range    Color Yellow     Character Clear     Specific Gravity 1.025 <1.035    Ph 7.0 5.0 - 8.0    Glucose Negative Negative mg/dL    Ketones >=80 (A) Negative mg/dL    Protein Negative Negative mg/dL    Bilirubin Negative Negative    Urobilinogen, Urine 0.2 Negative    Nitrite Negative Negative    Leukocyte Esterase Negative Negative    Occult Blood Trace (A) Negative    Micro Urine Req Microscopic    Chlamydia/GC PCR Urine Or Swab    Specimen: Genital   Result Value Ref Range    Source Cervical    Vaginal Pathogens DNA Panel   Result Value Ref Range    Candida species DNA Probe Negative Negative    Trichamonas vaginalis DNA Probe Negative Negative    Gardnerella vaginalis DNA Probe POSITIVE (A) Negative   URINE MICROSCOPIC (W/UA)   Result Value Ref Range    WBC 0-2 /hpf    RBC 0-2 /hpf    Bacteria Few (A) None /hpf    Epithelial Cells Few /hpf   HCG QUAL SERUM   Result Value Ref Range    Beta-Hcg Qualitative Serum Positive (A) Negative   HCG QUANTITATIVE   Result Value Ref Range    Bhcg 66038.0 (H) 0.0 - 5.0 mIU/mL   T.PALLIDUM AB EIA   Result Value Ref Range    Syphilis, Treponemal Qual Non-Reactive Non-Reactive   ESTIMATED GFR   Result Value Ref Range    GFR If African American >60 >60 mL/min/1.73 m 2    GFR If Non African American >60 >60 mL/min/1.73 m 2   RH TYPE FOR RHOGAM FROM E.D.   Result Value Ref  Range    Emergency Department Rh Typing POS     Number Of Rh Doses Indicated ZERO        RADIOLOGY  US-OB 1ST TRIMESTER WITH TRANSVAGINAL (COMBO)   Final Result      1.  Viable single intrauterine gestation of an estimated gestational age of 6 weeks with an GIANLUCA of 11/6/2021.   2.  There is no gross evidence of perigestational hemorrhage.            COURSE & MEDICAL DECISION MAKING  Nursing notes and vital signs were reviewed. (See chart for details)  The patients  records were reviewed, history was obtained from the patient;     Patient actively vomiting, dry heaving during my initial assessment.  Abdominal exam is benign.  Will start IV, labs, IV fluid and antiemetic and reassess for further exam.    CBC unremarkable.  Qualitative pregnancy positive.  Patient reevaluated bedside.  Pelvic exam as above.  Swabs for GC/chlamydia and vaginal pathogens sent.  Patient adamantly declines empiric treatment, will await results for all.  Add ultrasound, beta quant and Rh.    Wet prep demonstrates Gardnerella.  No trichomonas, no yeast.  GC and Chlamydia will be pending till tomorrow likely.  Awaiting ultrasound.    Ultrasound as above, with viable single intrauterine pregnancy with gestational age of 6 weeks.  No evidence of perigestational hemorrhage.  Patient's bleeding has improved from onset a week ago.  She has been cautioned for possible/threatened miscarriage.  Rh+, no indication for RhoGam.  She is otherwise asymptomatic in the emergency department, no abdominal pain, no vaginal bleeding on exam.    Patient will be treated for BV with Flagyl for 7 days.  She has refused empiric treatment for other STD/gonorrhea/chlamydia, but will follow up on testing as well with her doctor in [place] for treatment if necessary next week.  No clinical evidence for PID.  Abdominal exam is benign.  Hemodynamically stable.  Urinalysis within normal limits.    Patient is stable for discharge at this time, anticipatory guidance  provided, Flagyl for 7 days, start taking prenatal vitamin, close follow-up is encouraged, and strict ED return instructions have been detailed. Patient is agreeable to the disposition and plan.    Patient's blood pressure was elevated in the emergency department, and has been referred to primary care for close monitoring.      FINAL IMPRESSION  (O26.891,  R10.9) Abdominal pain during pregnancy in first trimester  (O21.9) Nausea/vomiting in pregnancy  (N76.0,  B96.89) Bacterial vaginosis  (Z20.2) Possible exposure to STD  (O46.90) Vaginal bleeding in pregnancy      Electronically signed by: Sarah Richards D.O., 3/13/2021 1:19 PM      This dictation was created using voice recognition software. The accuracy of the dictation is limited to the abilities of the software. I expect there may be some errors of grammar and possibly content. The nursing notes were reviewed and certain aspects of this information were incorporated into this note.